# Patient Record
Sex: FEMALE | Race: WHITE | URBAN - METROPOLITAN AREA
[De-identification: names, ages, dates, MRNs, and addresses within clinical notes are randomized per-mention and may not be internally consistent; named-entity substitution may affect disease eponyms.]

---

## 2020-06-17 ENCOUNTER — OFFICE VISIT CONVERTED (OUTPATIENT)
Dept: CARDIOLOGY | Facility: CLINIC | Age: 60
End: 2020-06-17
Attending: INTERNAL MEDICINE

## 2021-05-10 NOTE — H&P
History and Physical      Patient Name: Cielo Power   Patient ID: [id number]   Sex: Female   YOB: 1960    Primary Care Provider: Sunshine Ledezma   Referring Provider: Sunshine Ledezma    Visit Date: June 17, 2020    Provider: Kali Rice MD   Location: [facility]   Location Address: [address] [place] [address], [facility]   [place], [place]  [id number]   Location Phone: [phone]          Chief Complaint  · Coronary artery disease seen incidentally on CT scan      History Of Present Illness  Consult requested by: Sunshine Ledezma   Cielo Power is a 60-year-old female with a history of some dyspnea on exertion and had undergone a CT scan for routine screening due to her smoking history. She was found to have mild emphysematous changes as well as coronary atherosclerosis. The patient denies any anginal chest pain. She does report some mild dyspnea on exertion, but no significant limiting symptoms. No PND, or orthopnea. No tachycardia or palpitation issues.   PAST MEDICAL HISTORY: No significant major illnesses.   FAMILY MEDICAL HISTORY: Significant for premature coronary atherosclerotic disease on her dad's side.   PSYCHOSOCIAL HISTORY: No history of mood change or depression. She is . She smokes about 10 to 12 cigarettes per day. She does not drink alcohol, has caffeine daily.   CURRENT MEDICATIONS: Women's Health multivitamin. The dosage and frequency of the medication was reviewed with the patient.   ALLERGIES: No known drug allergies.       Review of Systems  · Constitutional  o Admits  o : good general health lately  o Denies  o : fatigue, recent weight changes   · Eyes  o Denies  o : double vision  · HENT  o Admits  o : hearing loss or ringing  o Denies  o : chronic sinus problem, swollen glands in neck  · Cardiovascular  o Denies  o : chest pain, palpitations (fast, fluttering, or skipping beats), swelling (feet, ankles, hands), shortness of breath while walking or lying  "flat  · Respiratory  o Admits  o : COPD  o Denies  o : asthma or wheezing  · Gastrointestinal  o Denies  o : ulcers, nausea or vomiting  · Neurologic  o Denies  o : lightheaded or dizzy, stroke, headaches  · Musculoskeletal  o Denies  o : joint pain, back pain  · Endocrine  o Denies  o : thyroid disease, diabetes, heat or cold intolerance, excessive thirst or urination  · Heme-Lymph  o Denies  o : bleeding or bruising tendency, anemia      Vitals  Date Time BP Position Site L\R Cuff Size HR RR TEMP (F) WT  HT  BMI kg/m2 BSA m2 O2 Sat HC       06/17/2020 10:28 /78 Sitting    64 - R   120lbs 0oz 5'  5\" 19.97 1.58           Physical Examination  · Constitutional  o Appearance  o : Awake, alert, in no acute distress.   · Head and Face  o HEENT  o : PERRLA.  · Eyes  o Conjunctivae  o : Normal.  · Ears, Nose, Mouth and Throat  o Oral Cavity  o :   § Oral Mucosa  § : Normal.  · Neck  o Inspection/Palpation  o : No JVD.  · Respiratory  o Respiratory  o : Chest is symmetrical. Lung fields are clear. No rhonchi or wheezes heard.  · Cardiovascular  o Heart  o :   § Auscultation of Heart  § : S1, S2 are normal. Regular rate and rhythm without murmurs, gallops, or rubs.  o Peripheral Vascular System  o :   § Extremities  § : Nails normal. No clubbing or cyanosis. Femoral pulses adequate. Pedal pulses adequate. No peripheral edema.  · Gastrointestinal  o Abdominal Examination  o : Abdomen soft. No masses. No guarding or rigidity. No hepatosplenomegaly. Bowel sounds normal.  · Musculoskeletal  o General  o :   § General Musculoskeletal  § : Muscle tone and strength were normal.  · Skin and Subcutaneous Tissue  o General Inspection  o : Unremarkable.  · Imaging  o Imaging  o : Chest CT showed mild emphysematous changes as well as coronary atherosclerotic disease.   · EKG  o EKG  o : Previous EKG reviewed.   o Results  o : Normal sinus rhythm with short DE interval but no evidence of any accessory pathway conduction. "   · Labs  o Labs  o : LDL cholesterol 110.      The patient did undergo a treadmill stress test today, was able to exercise for 8 minutes, had no clinical evidence of angina.  She had some indeterminate EKG changes.  Overall, low risk for any occlusive CAD, but she did have a paroxysmal SVT episode that occurred after she got off the treadmill, rate about 200s or so, then converted spontaneously and was asymptomatic.           Assessment     ASSESSMENT AND PLAN:  1.  Coronary artery disease, symptomatically and by EKG, nonocclusive in nature.  Recommend chronic        aspirin 81 mg once a day for prevention as well as Lipitor.  Will start her on Lipitor 20 mg once a day for a        goal LDL of less than 70.   Plan on repeating lipids in three months.  Since patient is not having any anginal        symptoms and her disease appears to be nonocclusive, feel patient could just follow up on an as-needed        basis.   2.  Paroxysmal SVT This occurred after the patient had gotten off the treadmill.  She was asymptomatic.         Suspect that it likely represents some type of re-entry tachycardia versus an ectopic atrial tachycardia.  The        patient has not had any overt symptoms of dysrhythmias that would warrant any pharmacological          treatment.  Would encourage avoidance of caffeinated products in general.      MD GERA Barger/gerard    This note was transcribed by Zhanna Varela.  gerard/gera  The above service was transcribed by Zhanna Varela, and I attest to the accuracy of the note.  GERA             Electronically Signed by: Sandra Varela-, Other -Author on June 18, 2020 09:11:48 AM  Electronically Co-signed by: Kali Rice MD -Reviewer on June 18, 2020 05:01:38 PM

## 2021-05-15 VITALS
SYSTOLIC BLOOD PRESSURE: 126 MMHG | HEART RATE: 64 BPM | WEIGHT: 120 LBS | DIASTOLIC BLOOD PRESSURE: 78 MMHG | BODY MASS INDEX: 19.99 KG/M2 | HEIGHT: 65 IN